# Patient Record
Sex: MALE | Race: BLACK OR AFRICAN AMERICAN | Employment: UNEMPLOYED | URBAN - METROPOLITAN AREA
[De-identification: names, ages, dates, MRNs, and addresses within clinical notes are randomized per-mention and may not be internally consistent; named-entity substitution may affect disease eponyms.]

---

## 2017-04-26 ENCOUNTER — OFFICE VISIT (OUTPATIENT)
Dept: FAMILY MEDICINE CLINIC | Age: 27
End: 2017-04-26

## 2017-04-26 VITALS
HEIGHT: 71 IN | TEMPERATURE: 97.7 F | BODY MASS INDEX: 24.22 KG/M2 | RESPIRATION RATE: 18 BRPM | WEIGHT: 173 LBS | SYSTOLIC BLOOD PRESSURE: 115 MMHG | OXYGEN SATURATION: 95 % | DIASTOLIC BLOOD PRESSURE: 72 MMHG | HEART RATE: 70 BPM

## 2017-04-26 DIAGNOSIS — Z00.00 ROUTINE PHYSICAL EXAMINATION: Primary | ICD-10-CM

## 2017-04-26 NOTE — PROGRESS NOTES
Patient: Jakub Bernal MRN: [id number]  SSN: [id number]    YOB: 1990  Age: 32 y.o. Sex: male      Date of Service: 4/26/2017   Provider: Desi Berry   Office Location:   [address] [place] [facility], [address] [place] [address], [address]  Office Phone: [phone]  Office Fax: [phone]        REASON FOR VISIT:   Jakub Bernal is a 32 y.o. male who presents to the office for a routine physical.      VITALS:   Visit Vitals    /72    Pulse 70    Temp 97.7 °F (36.5 °C) (Oral)    Resp 18    Ht 5' 11\" (1.803 m)    Wt 173 lb (78.5 kg)    SpO2 95%    BMI 24.13 kg/m2       MEDICATIONS:   No current outpatient prescriptions on file prior to visit. No current facility-administered medications on file prior to visit.          ALLERGIES:   Allergies   Allergen Reactions    Morphine Itching     insomia         ACTIVE MEDICAL PROBLEMS:  Patient Active Problem List   Diagnosis Code    Frequent headaches R51        PAST MEDICAL HISTORY:  Past Medical History:   Diagnosis Date    ADHD (attention deficit hyperactivity disorder)         SURGICAL HISTORY:  Past Surgical History:   Procedure Laterality Date    HX KNEE ARTHROSCOPY  2009        FAMILY HISTORY:  Family History   Problem Relation Age of Onset    Cancer Mother 36    Heart Disease Father     Sickle Cell Anemia Sister         SOCIAL HISTORY:  Social History     Social History    Marital status: SINGLE     Spouse name: N/A    Number of children: 2    Years of education: 15     Social History Main Topics    Smoking status: Current Every Day Smoker    Smokeless tobacco: None    Alcohol use No      Comment: Former    Drug use: Yes     Special: Cocaine      Comment: Former    Sexual activity: No     Other Topics Concern     Service No    Blood Transfusions No    Caffeine Concern No    Occupational Exposure No    Hobby Hazards No    Sleep Concern No    Stress Concern No    Weight Concern No    Special Diet No    Back Care No    Exercise No    Bike Helmet No    Seat Belt Yes    Self-Exams No     Social History Narrative          HPI:  Liudmila Chambers is a 32 y.o. male who presents to the office for a routine physical exam. Needs clearance to participate in a group home - [facility]. Patient has a history of prior cocaine addiction, has been clean for 2 years, but feels he might benefit from group program which would allow him to learn a trade, attend counseling and group sessions. Concerns today:  Chronic L knee pain  Injured playing football in 2009, underwent 2 surgeries to repair ACL, MCL  Aggravated by physical labor  Might like to go back to see ortho once he has insurance     Status of chronic medical conditions:   No chronic medical problems  Has tested positive for sickle cell trait in the past but has never been symptomatic       REVIEW OF SYSTEMS:   Review of Systems   Constitutional: Negative for chills, diaphoresis, fever, malaise/fatigue and weight loss. HENT: Negative for congestion, ear pain and sore throat. Eyes: Negative for blurred vision and double vision. Respiratory: Negative for cough, shortness of breath and wheezing. Cardiovascular: Negative for chest pain and palpitations. Gastrointestinal: Negative for abdominal pain, blood in stool, constipation, diarrhea, nausea and vomiting. Genitourinary: Negative for frequency and urgency. Musculoskeletal: Positive for joint pain ( L knee). Negative for back pain, myalgias and neck pain. Skin: Negative for itching and rash. Neurological: Negative for dizziness and headaches. Psychiatric/Behavioral: Positive for substance abuse ( see above). Negative for depression. The patient is not nervous/anxious. PHYSICAL EXAMINATION:   Physical Exam   Constitutional: He is oriented to person, place, and time and well-developed, well-nourished, and in no distress.    HENT:   Head: Normocephalic and atraumatic. Right Ear: External ear normal.   Left Ear: External ear normal.   Nose: Nose normal.   Mouth/Throat: Oropharynx is clear and moist.   Eyes: Conjunctivae and EOM are normal. Pupils are equal, round, and reactive to light. Neck: Neck supple. No thyromegaly present. Cardiovascular: Normal rate, regular rhythm, normal heart sounds and intact distal pulses. Exam reveals no gallop and no friction rub. No murmur heard. Pulmonary/Chest: Effort normal and breath sounds normal. He has no wheezes. He has no rales. Abdominal: Soft. Bowel sounds are normal. He exhibits no distension and no mass. There is no tenderness. There is no rebound and no guarding. Lymphadenopathy:     He has no cervical adenopathy. Neurological: He is alert and oriented to person, place, and time. He has normal reflexes. Gait normal.   Skin: Skin is warm and dry. No rash noted. Psychiatric: Mood, memory and affect normal.        ASSESSMENT/PLAN:    Govind Rodriguez was seen today for physical.    Diagnoses and all orders for this visit:    Routine physical examination  -     RPR; Future  -     HEPATIC FUNCTION PANEL; Future      Normal physical exam findings. Lucero Rosenthal is cleared to participate in group living without restriction.          Desi Cabrera   4/27/2017   3:48 PM

## 2017-04-26 NOTE — MR AVS SNAPSHOT
Visit Information Date & Time Provider Department Dept. Phone Encounter #  
 4/26/2017  5:30 PM [facility] [phone] [id number] Your Appointments 4/26/2017  5:30 PM  
New Patient with TIA Ramirez ([facility]) Appt Note: -[facility] for rehab  pt needs tb - called office was told [facility]pe only would be 35.00; photo id, 35.00 at appt med list, any forms to be fillled out [facility] [id number]  
[facility] [id number] Upcoming Health Maintenance Date Due DTaP/Tdap/Td series (1 - Tdap) 11/7/2011 INFLUENZA AGE 9 TO ADULT 8/1/2016 Allergies as of 4/26/2017  Review Complete On: 4/26/2017 By: TIA Ramirez Severity Noted Reaction Type Reactions Morphine High 04/08/2015   Systemic Itching  
 insomia Current Immunizations  Never Reviewed No immunizations on file. Not reviewed this visit Vitals BP Pulse Temp Resp Height(growth percentile) Weight(growth percentile) 115/72 70 97.7 °F (36.5 °C) (Oral) 18 5' 11\" (1.803 m) 173 lb (78.5 kg) SpO2 BMI Smoking Status 95% 24.13 kg/m2 Current Every Day Smoker Vitals History BMI and BSA Data Body Mass Index Body Surface Area  
 24.13 kg/m 2 1.98 m 2 Your Updated Medication List  
  
Notice  As of 4/26/2017  5:27 PM  
 You have not been prescribed any medications. Introducing \[facility] [place] [facility]\"" & HEALTH SERVICES! Edwin Allan introduces [employer] patient portal. Now you can access parts of your medical record, email your doctor's office, and request medication refills online. 1. In your internet browser, go to [email or web address] 2. Click on the First Time User? Click Here link in the Sign In box. You will see the New Member Sign Up page. 3. Enter your [employer] Access Code exactly as it appears below. You will not need to use this code after youve completed the sign-up process. If you do not sign up before the expiration date, you must request a new code. · [employer] Access Code: [email or web address] Expires: 7/25/2017  4:37 PM 
 
4. Enter the last four digits of your Social Security Number (xxxx) and Date of Birth (mm/dd/yyyy) as indicated and click Submit. You will be taken to the next sign-up page. 5. Create a [employer] ID. This will be your [employer] login ID and cannot be changed, so think of one that is secure and easy to remember. 6. Create a [employer] password. You can change your password at any time. 7. Enter your Password Reset Question and Answer. This can be used at a later time if you forget your password. 8. Enter your e-mail address. You will receive e-mail notification when new information is available in [address] 9. Click Sign Up. You can now view and download portions of your medical record. 10. Click the Download Summary menu link to download a portable copy of your medical information. If you have questions, please visit the Frequently Asked Questions section of the [employer] website. Remember, [employer] is NOT to be used for urgent needs. For medical emergencies, dial 911. Now available from your iPhone and Android! Please provide this summary of care documentation to your next provider. Your primary care clinician is listed as Bandar Parker. If you have any questions after today's visit, please call [phone].